# Patient Record
Sex: FEMALE
[De-identification: names, ages, dates, MRNs, and addresses within clinical notes are randomized per-mention and may not be internally consistent; named-entity substitution may affect disease eponyms.]

---

## 2018-09-11 ENCOUNTER — HOSPITAL ENCOUNTER (INPATIENT)
Dept: HOSPITAL 5 - ED | Age: 78
LOS: 1 days | Discharge: LEFT BEFORE DISCHARGE | DRG: 690 | End: 2018-09-12
Attending: INTERNAL MEDICINE | Admitting: INTERNAL MEDICINE
Payer: SELF-PAY

## 2018-09-11 DIAGNOSIS — R91.8: ICD-10-CM

## 2018-09-11 DIAGNOSIS — N30.00: Primary | ICD-10-CM

## 2018-09-11 DIAGNOSIS — R00.1: ICD-10-CM

## 2018-09-11 DIAGNOSIS — F41.9: ICD-10-CM

## 2018-09-11 DIAGNOSIS — F03.90: ICD-10-CM

## 2018-09-11 DIAGNOSIS — Z86.718: ICD-10-CM

## 2018-09-11 LAB
BILIRUB UR QL STRIP: (no result)
BLOOD UR QL VISUAL: (no result)
BUN SERPL-MCNC: 18 MG/DL (ref 7–17)
BUN/CREAT SERPL: 20 %
CALCIUM SERPL-MCNC: 9.4 MG/DL (ref 8.4–10.2)
HCT VFR BLD CALC: 41 % (ref 30.3–42.9)
HEMOLYSIS INDEX: 17
HGB BLD-MCNC: 14.1 GM/DL (ref 10.1–14.3)
MCH RBC QN AUTO: 33 PG (ref 28–32)
MCHC RBC AUTO-ENTMCNC: 34 % (ref 30–34)
MCV RBC AUTO: 96 FL (ref 79–97)
MUCOUS THREADS #/AREA URNS HPF: (no result) /HPF
PH UR STRIP: 7 [PH] (ref 5–7)
PLATELET # BLD: 149 K/MM3 (ref 140–440)
PROT UR STRIP-MCNC: (no result) MG/DL
RBC # BLD AUTO: 4.28 M/MM3 (ref 3.65–5.03)
RBC #/AREA URNS HPF: < 1 /HPF (ref 0–6)
UROBILINOGEN UR-MCNC: 2 MG/DL (ref ?–2)
WBC #/AREA URNS HPF: 19 /HPF (ref 0–6)

## 2018-09-11 PROCEDURE — 93010 ELECTROCARDIOGRAM REPORT: CPT

## 2018-09-11 PROCEDURE — 85027 COMPLETE CBC AUTOMATED: CPT

## 2018-09-11 PROCEDURE — 85379 FIBRIN DEGRADATION QUANT: CPT

## 2018-09-11 PROCEDURE — 93005 ELECTROCARDIOGRAM TRACING: CPT

## 2018-09-11 PROCEDURE — 81001 URINALYSIS AUTO W/SCOPE: CPT

## 2018-09-11 PROCEDURE — 36415 COLL VENOUS BLD VENIPUNCTURE: CPT

## 2018-09-11 PROCEDURE — 80048 BASIC METABOLIC PNL TOTAL CA: CPT

## 2018-09-11 PROCEDURE — 71045 X-RAY EXAM CHEST 1 VIEW: CPT

## 2018-09-11 PROCEDURE — 71260 CT THORAX DX C+: CPT

## 2018-09-11 PROCEDURE — 83036 HEMOGLOBIN GLYCOSYLATED A1C: CPT

## 2018-09-11 PROCEDURE — 85025 COMPLETE CBC W/AUTO DIFF WBC: CPT

## 2018-09-11 PROCEDURE — 84443 ASSAY THYROID STIM HORMONE: CPT

## 2018-09-11 PROCEDURE — 83735 ASSAY OF MAGNESIUM: CPT

## 2018-09-11 PROCEDURE — 70450 CT HEAD/BRAIN W/O DYE: CPT

## 2018-09-11 PROCEDURE — 84484 ASSAY OF TROPONIN QUANT: CPT

## 2018-09-11 NOTE — XRAY REPORT
FINAL REPORT



EXAM:  XR CHEST 1V AP



HISTORY:  hypoxia, dizzyness 



TECHNIQUE:  Frontal chest x-ray performed portably and upright



Comparison: None



FINDINGS:  

Heart size is enlarged with left ventricular configuration

compatible with systemic hypertension.



There is convexity and fullness of the right hilum with a

possible right hilar mass versus prominent pulmonary artery

measuring about 2 centimeters. The overall convexity of the right

hilum is suspicious.



There is mild right perihilar infiltrate.



Left costophrenic angle is blunted.



Aorta is mildly atherosclerotic.



Bones are osteopenic.



IMPRESSION:  

Findings suspicious for right hilar mass.  Right perihilar

infiltrate.



Blunting of left costophrenic sulcus with possible left effusion

versus patchy infiltrate.



Left ventricular configuration to the mildly enlarged heart.



Recommend CT chest with IV contrast to further characterize these

findings.

## 2018-09-11 NOTE — EMERGENCY DEPARTMENT REPORT
ED Syncope HPI





- General


Chief Complaint: Syncope


Stated Complaint: SYNCOPY


Time Seen by Provider: 09/11/18 15:37


Source: patient, family, RN notes reviewed


Exam Limitations: clinical condition, other (patient is demented.  Patient is a 

poor historian)





- History of Present Illness


Initial Comments: 





This is a 78-year-old female who is not known to this provider previously, has 

a past medical history of dementia, possible hypertension.  Patient was 

scheduled for an outpatient ophthalmologic procedure today, was sitting down, 

and reports feeling lightheaded and passing out.  Prior to these symptoms, she 

was not having any complaints.  She currently denies all complaints.  She 

denies severe headache, neck pain, chest pain, abdominal pain, shortness of 

breath.  She denies bright red blood per rectum.  She reports dizziness, but 

cannot further describe it.


Timing/Prior Episodes: single episode today


Precipitating Factors: Positive: none


Context: sitting


Loss of Consciousness: brief (seconds)


Current Symptoms: back to normal





- Related Data


Allergies/Adverse Reactions: 


Allergies





No Known Allergies Allergy (Unverified 09/11/18 13:51)


 








Home Medications: 


Ambulatory Orders





Aricept 10 PO QDAY 09/11/18 











ED Review of Systems


ROS: 


Stated complaint: SYNCOPY


Other details as noted in HPI





Constitutional: denies: diaphoresis, fever


Eyes: denies: eye discharge


ENT: denies: epistaxis


Respiratory: denies: cough


Cardiovascular: syncope.  denies: chest pain


Gastrointestinal: denies: abdominal pain


Genitourinary: denies: dysuria


Musculoskeletal: denies: back pain


Neurological: headache


Psychiatric: anxiety





ED Past Medical Hx





- Past Medical History


Additional medical history: New onset of Dementia





- Social History


Smoking Status: Never Smoker


Substance Use Type: None





- Medications


Home Medications: 


 Home Medications











 Medication  Instructions  Recorded  Confirmed  Last Taken  Type


 


Aricept 10 PO QDAY 09/11/18  09/10/18 20:00 History














ED Physical Exam





- General


Limitations: Other (patient is demented.  Patient is a poor historian)


General appearance: alert, in no apparent distress





- Head


Head exam: Present: atraumatic, normocephalic





- Eye


Eye exam: Present: normal appearance, EOMI, other (visual acuity intact to 

finger counting, color perception, reading at a close distance).  Absent: 

nystagmus





- ENT


ENT exam: Present: normal exam, normal orophraynx, mucous membranes moist, 

normal external ear exam





- Neck


Neck exam: Present: normal inspection, full ROM.  Absent: tenderness, 

meningismus





- Respiratory


Respiratory exam: Present: decreased breath sounds.  Absent: respiratory 

distress





- Cardiovascular


Cardiovascular Exam: Present: normal rhythm, bradycardia, normal heart sounds.  

Absent: systolic murmur, diastolic murmur, rubs, gallop





- GI/Abdominal


GI/Abdominal exam: Present: soft.  Absent: distended, tenderness, guarding, 

rebound, rigid, pulsatile mass





- Extremities Exam


Extremities exam: Present: normal inspection, full ROM, normal capillary refill

, other (2+ pulses noted in the bilateral upper, lower extremities.  

Compartments soft.  No long bony tenderness.  The pelvis is stable.).  Absent: 

pedal edema, joint swelling, calf tenderness (there is no palpable cord.  There 

is a negative Homans sign)





- Back Exam


Back exam: Present: normal inspection, full ROM.  Absent: tenderness, CVA 

tenderness (R), paraspinal tenderness, vertebral tenderness





- Neurological Exam


Neurological exam: Present: alert, oriented X3, CN II-XII intact, other (

Extraocular movements intact.  Tongue midline.  No facial droop.  Facial 

sensation intact to light touch in the V1, V2, V3 distribution bilaterally.  5 

and 5 strength in 4 extremities..  Sensation is intact to light touch in 4 

extremities.).  Absent: motor sensory deficit





- Psychiatric


Psychiatric exam: Present: normal affect, normal mood





- Skin


Skin exam: Present: warm, dry, intact, normal color.  Absent: rash





ED Course


 Vital Signs











  09/11/18 09/11/18 09/11/18





  13:28 13:30 13:39


 


Temperature   97.4 F L


 


Pulse Rate 51 L 61 58 L


 


Respiratory 11 L 12 19





Rate   


 


Blood Pressure  188/90 155/78


 


Blood Pressure   155/78





[Left]   


 


O2 Sat by Pulse   95





Oximetry   














  09/11/18 09/11/18 09/11/18





  13:46 14:00 14:16


 


Temperature   


 


Pulse Rate 53 L 66 58 L


 


Respiratory 14 14 17





Rate   


 


Blood Pressure 187/81 186/85 180/78


 


Blood Pressure   





[Left]   


 


O2 Sat by Pulse   





Oximetry   














  09/11/18 09/11/18 09/11/18





  14:30 14:45 15:00


 


Temperature   


 


Pulse Rate 43 L 65 60


 


Respiratory 14 15 16





Rate   


 


Blood Pressure 170/72 181/76 181/76


 


Blood Pressure   





[Left]   


 


O2 Sat by Pulse  94 





Oximetry   














  09/11/18 09/11/18 09/11/18





  15:16 15:30 15:46


 


Temperature   


 


Pulse Rate 64 62 50 L


 


Respiratory 12 16 16





Rate   


 


Blood Pressure 186/92 186/92 186/92


 


Blood Pressure   





[Left]   


 


O2 Sat by Pulse  96 96





Oximetry   














  09/11/18 09/11/18 09/11/18





  16:00 16:16 16:30


 


Temperature   


 


Pulse Rate 82 71 70


 


Respiratory 15 14 15





Rate   


 


Blood Pressure 186/92 186/92 186/92


 


Blood Pressure   





[Left]   


 


O2 Sat by Pulse 92 92 88





Oximetry   














  09/11/18 09/11/18





  17:10 17:16


 


Temperature  


 


Pulse Rate  70


 


Respiratory  16





Rate  


 


Blood Pressure 187/84 179/71


 


Blood Pressure  





[Left]  


 


O2 Sat by Pulse 91 93





Oximetry  














ED Medical Decision Making





- Lab Data


Result diagrams: 


 09/11/18 13:58





 09/11/18 15:12








 Vital Signs











  09/11/18 09/11/18 09/11/18





  13:28 13:30 13:39


 


Temperature   97.4 F L


 


Pulse Rate 51 L 61 58 L


 


Respiratory 11 L 12 19





Rate   


 


Blood Pressure  188/90 155/78


 


Blood Pressure   155/78





[Left]   


 


O2 Sat by Pulse   95





Oximetry   














  09/11/18 09/11/18 09/11/18





  13:46 14:00 14:16


 


Temperature   


 


Pulse Rate 53 L 66 58 L


 


Respiratory 14 14 17





Rate   


 


Blood Pressure 187/81 186/85 180/78


 


Blood Pressure   





[Left]   


 


O2 Sat by Pulse   





Oximetry   














  09/11/18 09/11/18 09/11/18





  14:30 14:45 15:00


 


Temperature   


 


Pulse Rate 43 L 65 60


 


Respiratory 14 15 16





Rate   


 


Blood Pressure 170/72 181/76 181/76


 


Blood Pressure   





[Left]   


 


O2 Sat by Pulse  94 





Oximetry   














  09/11/18 09/11/18 09/11/18





  15:16 15:30 15:46


 


Temperature   


 


Pulse Rate 64 62 50 L


 


Respiratory 12 16 16





Rate   


 


Blood Pressure 186/92 186/92 186/92


 


Blood Pressure   





[Left]   


 


O2 Sat by Pulse  96 96





Oximetry   














  09/11/18 09/11/18 09/11/18





  16:00 16:16 16:30


 


Temperature   


 


Pulse Rate 82 71 70


 


Respiratory 15 14 15





Rate   


 


Blood Pressure 186/92 186/92 186/92


 


Blood Pressure   





[Left]   


 


O2 Sat by Pulse 92 92 88





Oximetry   














  09/11/18 09/11/18





  17:10 17:16


 


Temperature  


 


Pulse Rate  70


 


Respiratory  16





Rate  


 


Blood Pressure 187/84 179/71


 


Blood Pressure  





[Left]  


 


O2 Sat by Pulse 91 93





Oximetry  











 Lab Results











  09/11/18 09/11/18 09/11/18 Range/Units





  13:58 15:12 15:40 


 


WBC  7.1    (4.5-11.0)  K/mm3


 


RBC  4.28    (3.65-5.03)  M/mm3


 


Hgb  14.1    (10.1-14.3)  gm/dl


 


Hct  41.0    (30.3-42.9)  %


 


MCV  96    (79-97)  fl


 


MCH  33 H    (28-32)  pg


 


MCHC  34    (30-34)  %


 


RDW  13.3    (13.2-15.2)  %


 


Plt Count  149    (140-440)  K/mm3


 


D-Dimer     (0-234)  ng/mlDDU


 


Sodium   140   (137-145)  mmol/L


 


Potassium   5.0   (3.6-5.0)  mmol/L


 


Chloride   103.0   ()  mmol/L


 


Carbon Dioxide   26   (22-30)  mmol/L


 


Anion Gap   16   mmol/L


 


BUN   18 H   (7-17)  mg/dL


 


Creatinine   0.9   (0.7-1.2)  mg/dL


 


Estimated GFR   > 60   ml/min


 


BUN/Creatinine Ratio   20   %


 


Glucose   107 H   ()  mg/dL


 


Calcium   9.4   (8.4-10.2)  mg/dL


 


Magnesium     (1.7-2.3)  mg/dL


 


Troponin T     (0.00-0.029)  ng/mL


 


TSH     (0.270-4.200)  mlU/mL


 


Urine Color    Yellow  (Yellow)  


 


Urine Turbidity    Slightly-cloudy  (Clear)  


 


Urine pH    7.0  (5.0-7.0)  


 


Ur Specific Gravity    1.013  (1.003-1.030)  


 


Urine Protein    <15 mg/dl  (Negative)  mg/dL


 


Urine Glucose (UA)    Neg  (Negative)  mg/dL


 


Urine Ketones    Tr  (Negative)  mg/dL


 


Urine Blood    Neg  (Negative)  


 


Urine Nitrite    Neg  (Negative)  


 


Urine Bilirubin    Neg  (Negative)  


 


Urine Urobilinogen    2.0  (<2.0)  mg/dL


 


Ur Leukocyte Esterase    Lg  (Negative)  


 


Urine WBC (Auto)    19.0 H  (0.0-6.0)  /HPF


 


Urine RBC (Auto)    < 1.0  (0.0-6.0)  /HPF


 


U Epithel Cells (Auto)    1.0  (0-13.0)  /HPF


 


Urine Mucus    Few  /HPF














  09/11/18 09/11/18 09/11/18 Range/Units





  15:51 15:51 15:51 


 


WBC     (4.5-11.0)  K/mm3


 


RBC     (3.65-5.03)  M/mm3


 


Hgb     (10.1-14.3)  gm/dl


 


Hct     (30.3-42.9)  %


 


MCV     (79-97)  fl


 


MCH     (28-32)  pg


 


MCHC     (30-34)  %


 


RDW     (13.2-15.2)  %


 


Plt Count     (140-440)  K/mm3


 


D-Dimer  186.24    (0-234)  ng/mlDDU


 


Sodium     (137-145)  mmol/L


 


Potassium     (3.6-5.0)  mmol/L


 


Chloride     ()  mmol/L


 


Carbon Dioxide     (22-30)  mmol/L


 


Anion Gap     mmol/L


 


BUN     (7-17)  mg/dL


 


Creatinine     (0.7-1.2)  mg/dL


 


Estimated GFR     ml/min


 


BUN/Creatinine Ratio     %


 


Glucose     ()  mg/dL


 


Calcium     (8.4-10.2)  mg/dL


 


Magnesium   2.00   (1.7-2.3)  mg/dL


 


Troponin T   < 0.010   (0.00-0.029)  ng/mL


 


TSH    1.440  (0.270-4.200)  mlU/mL


 


Urine Color     (Yellow)  


 


Urine Turbidity     (Clear)  


 


Urine pH     (5.0-7.0)  


 


Ur Specific Gravity     (1.003-1.030)  


 


Urine Protein     (Negative)  mg/dL


 


Urine Glucose (UA)     (Negative)  mg/dL


 


Urine Ketones     (Negative)  mg/dL


 


Urine Blood     (Negative)  


 


Urine Nitrite     (Negative)  


 


Urine Bilirubin     (Negative)  


 


Urine Urobilinogen     (<2.0)  mg/dL


 


Ur Leukocyte Esterase     (Negative)  


 


Urine WBC (Auto)     (0.0-6.0)  /HPF


 


Urine RBC (Auto)     (0.0-6.0)  /HPF


 


U Epithel Cells (Auto)     (0-13.0)  /HPF


 


Urine Mucus     /HPF














- EKG Data


-: EKG Interpreted by Me





- EKG Data


When compared to previous EKG there are: previous EKG unavailable





09/11/18 17:36


Sinus bradycardia, 49 bpm, normal axis, a long AZ interval.  Low voltage.  Poor 

R-wave progression.  Abnormal EKG.  Not having chest pain.  Not a STEMI.





- Radiology Data


Radiology results: report reviewed, image reviewed


interpreted by me: 





Single portable view of the chest demonstrates poor inspiratory effort, 

elevated left hemidiaphragm, no acute disease





Noncontrast CT scan of the brain is negative for acute disease





- Medical Decision Making





Differential diagnosis, including but not limited to: Orthostasis, structural 

cardiac disease, arrhythmia, pulmonary embolus, intracranial injury, stroke, TIA

,, pneumonia, urinary tract infection











Assessment and plan: 78-year-old female with nonspecific resolved syncope and 

dizziness.  GCS of 15, and that score of 0.  D-dimer negative, initially 

saturating at 93, 94%.  Denies chest pain and shortness of breath.  sHe has no 

temporal tenderness, and has no jaw claudication.  Urinalysis is nonspecific 

with 19 white blood cells and leukocyte esterase.  However does not endorse 

urinary symptoms





Patient has been observed in the ER for hours without recurrent event.  However

, she is impressively bradycardic with a prolonged AZ interval.  I suspect 

primary cardiac issue as the underlying etiology of her syncopal event.  We 

will withhold ashwini blocking agents.  She'll be admitted to the medical service 

for further evaluation of her syncope.  She is normotensive at this time with 

an appropriate mental status, she does not require emergency cardiology consult 

or emergency pacing at this time.  I will defer to inpatient cardiology team if 

they feel like an inpatient cardiology consultation is appropriate, in the 

Hospital physician, Dr. Landaverde has accepted the patient to his service.








Critical care attestation.: 


If time is entered above; I have spent that time in minutes in the direct care 

of this critically ill patient, excluding procedure time.








ED Disposition


Clinical Impression: 


 Syncope, Bradycardia





Disposition: DC-[id number] OP ADMIT IP TO THIS HOSP


Is pt being admited?: Yes


Condition: Good


Instructions:  Syncope (ED)


Referrals: 


PRIMARY CARE,MD [Primary Care Provider] - 3-5 Days

## 2018-09-11 NOTE — CAT SCAN REPORT
FINAL REPORT



PROCEDURE:  CT CHEST W CON



TECHNIQUE:  Computerized axial tomography of the chest was

performed during the IV injection of iodinated nonionic contrast.





HISTORY:  right hilar mass 



COMPARISON:  No prior studies are available for comparison.



TECHNICAL QUALITY:  Satisfactory.



FINDINGS:  

Diffuse thickening of interstitial septa is noted there are no

confluent infiltrates or mass lesions. Pleural spaces are clear.

Bilateral hilar structures are within normal limits. Aorta is of

normal caliber. Coronary arterial calcification is noted. There

is mild cardiomegaly. Small hiatal hernia is noted. Visualized

thyroid demonstrates normal size and density. There is no

lymphadenopathy. Moderate degree wedge compression deformity is

noted involving T9 vertebral body without obvious spinal canal

compromise. Multiple cystic lesions are identified in bilateral

lobes of liver largest measuring 1.8 x 1.3 centimeters located in

the left lobe. 



IMPRESSION:  

Bilateral lungs demonstrate interstitial septal thickening most

likely representing interstitial fibrosis



No acute pulmonary infiltrates



Cardiomegaly with coronary arterial calcification



Small hiatal hernia



Multiple cystic lesions of liver most likely represent simple

cysts. Ultrasound evaluation may be recommended.

## 2018-09-11 NOTE — CAT SCAN REPORT
FINAL REPORT



EXAM:  CT HEAD/BRAIN WO CON



HISTORY:  syncope dizzyness 



TECHNIQUE:  CT examination of the head without IV contrast



PRIORS:  None.



FINDINGS:  

Slight mucosal thickening left sphenoid sinus. Slight mucosal

thickening mid right ethmoid sinus with small fluid level. Clear

mastoid air cells and middle ear cavities. No acute air-fluid

level visualized in the included air-filled sinuses. 



Bone windows demonstrate no acute fracture. 



There is ventricular and sulcal prominence compatible with global

cerebrocortical atrophy. 



Low attenuation regions in the cerebral white matter, while

nonspecific, are present and usually attributed to chronic

ischemic gliosis. It can occur secondary to the normal aging

process, hypertension, or arterial sclerotic vascular disease.

The differential includes demyelination in the appropriate

clinical setting.



The brain contains no mass, mass effect, hemorrhage, or acute

infarct. There is no extra-axial intracranial bleed or brain

bleed. There is no midline shift. 







IMPRESSION:  

No acute CVA, intracranial bleed, or brain mass



Nonspecific small fluid level in right ethmoid sinus may reflect

acute sinusitis

## 2018-09-12 VITALS — SYSTOLIC BLOOD PRESSURE: 165 MMHG | DIASTOLIC BLOOD PRESSURE: 86 MMHG

## 2018-09-12 LAB
BASOPHILS # (AUTO): 0 K/MM3 (ref 0–0.1)
BASOPHILS NFR BLD AUTO: 0.2 % (ref 0–1.8)
BUN SERPL-MCNC: 17 MG/DL (ref 7–17)
BUN/CREAT SERPL: 17 %
CALCIUM SERPL-MCNC: 8.9 MG/DL (ref 8.4–10.2)
EOSINOPHIL # BLD AUTO: 0.1 K/MM3 (ref 0–0.4)
EOSINOPHIL NFR BLD AUTO: 1.7 % (ref 0–4.3)
HCT VFR BLD CALC: 40.7 % (ref 30.3–42.9)
HEMOLYSIS INDEX: 22
HGB BLD-MCNC: 13.7 GM/DL (ref 10.1–14.3)
LYMPHOCYTES # BLD AUTO: 1.6 K/MM3 (ref 1.2–5.4)
LYMPHOCYTES NFR BLD AUTO: 24.4 % (ref 13.4–35)
MCH RBC QN AUTO: 33 PG (ref 28–32)
MCHC RBC AUTO-ENTMCNC: 34 % (ref 30–34)
MCV RBC AUTO: 97 FL (ref 79–97)
MONOCYTES # (AUTO): 0.4 K/MM3 (ref 0–0.8)
MONOCYTES % (AUTO): 5.6 % (ref 0–7.3)
PLATELET # BLD: 177 K/MM3 (ref 140–440)
RBC # BLD AUTO: 4.2 M/MM3 (ref 3.65–5.03)

## 2018-09-12 NOTE — EVENT NOTE
Date: 09/12/18


Patient left AMA prior to be seen. I never saw this patient or knew of this 

patient being here prior to her name popping up on my list.

## 2019-09-20 NOTE — HISTORY AND PHYSICAL REPORT
History of Present Illness


Date of examination: 09/11/18


Date of admission: 


09/11/18 17:41





Chief complaint: 


Chief complaint:


Passed  out at the ophthalmologist office around 12:00 noon





History of present illness: 





 History of Present Illness


Initial Comments: 





This is a 78-year-old female who is not known to this provider previously, has 

a past medical history of dementia, possible hypertension.  Patient was 

scheduled for an outpatient ophthalmologic procedure today, was sitting down, 

and reports feeling lightheaded and passing out.  Prior to these symptoms, she 

was not having any complaints.  She currently denies all complaints.  She 

denies severe headache, neck pain, chest pain, abdominal pain, shortness of 

breath.  She denies bright red blood per rectum.  She reports dizziness, but 

cannot further describe it.


Timing/Prior Episodes: single episode today


Precipitating Factors: Positive: none


Context: sitting


Loss of Consciousness: brief (seconds)


Current Symptoms: back to normal





- Related Data


Allergies/Adverse Reactions: 


Allergies





No Known Allergies Allergy (Unverified 09/11/18 13:51)


 








Home Medications: 


Ambulatory Orders





Aricept 10 PO QDAY 09/11/18 











ED Review of Systems


ROS: 


Stated complaint: SYNCOPY


Other details as noted in HPI





Constitutional: denies: diaphoresis, fever


Eyes: denies: eye discharge


ENT: denies: epistaxis


Respiratory: denies: cough


Cardiovascular: syncope.  denies: chest pain


Gastrointestinal: denies: abdominal pain


Genitourinary: denies: dysuria


Musculoskeletal: denies: back pain


Neurological: headache


Psychiatric: anxiety





ED Past Medical Hx





- Past Medical History


Additional medical history: New onset of Dementia





- Social History


Smoking Status: Never Smoker


Substance Use Type: None





- Medications


Home Medications: 


 Home Medications











 Medication  Instructions  Recorded  Confirmed  Last Taken  Type


 


Aricept 10 PO QDAY 09/11/18  09/10/18 20:00 History

















Medications and Allergies


 Allergies











Allergy/AdvReac Type Severity Reaction Status Date / Time


 


No Known Allergies Allergy   Unverified 09/11/18 13:51











 Home Medications











 Medication  Instructions  Recorded  Confirmed  Last Taken  Type


 


Aricept 10 PO QDAY 09/11/18  09/10/18 20:00 History














Review of Systems


All systems: negative





Exam





- Constitutional


Vitals: 


 











Temp Pulse Resp BP Pulse Ox


 


 97.4 F L  75   17   160/75   93 


 


 09/11/18 13:39  09/11/18 19:46  09/11/18 19:46  09/11/18 19:46  09/11/18 19:46











General appearance: Present: no acute distress, well-nourished





- EENT


Eyes: Present: PERRL


ENT: hearing intact, clear oral mucosa





- Neck


Neck: Present: supple, normal ROM





- Respiratory


Respiratory effort: normal


Respiratory: bilateral: CTA





- Cardiovascular


Heart rate: 49


Rhythm: regular


Heart Sounds: Present: S1 & S2.  Absent: rub, click





- Extremities


Extremities: no ischemia, pulses intact, pulses symmetrical, No edema


Peripheral Pulses: within normal limits





- Abdominal


General gastrointestinal: Present: soft, non-tender, non-distended, normal 

bowel sounds


Female genitourinary: Present: normal





- Integumentary


Integumentary: Present: clear, warm, dry





- Musculoskeletal


Musculoskeletal: gait normal, strength equal bilaterally





- Psychiatric


Psychiatric: appropriate mood/affect, intact judgment & insight





- Neurologic


Neurologic: CNII-XII intact, moves all extremities





- Allied Health


Allied health notes reviewed: nursing, case management





Results





- Labs


CBC & Chem 7: 


 09/11/18 13:58





 09/11/18 15:12


Labs: 


 Laboratory Last Values











WBC  7.1 K/mm3 (4.5-11.0)   09/11/18  13:58    


 


RBC  4.28 M/mm3 (3.65-5.03)   09/11/18  13:58    


 


Hgb  14.1 gm/dl (10.1-14.3)   09/11/18  13:58    


 


Hct  41.0 % (30.3-42.9)   09/11/18  13:58    


 


MCV  96 fl (79-97)   09/11/18  13:58    


 


MCH  33 pg (28-32)  H  09/11/18  13:58    


 


MCHC  34 % (30-34)   09/11/18  13:58    


 


RDW  13.3 % (13.2-15.2)   09/11/18  13:58    


 


Plt Count  149 K/mm3 (140-440)   09/11/18  13:58    


 


D-Dimer  186.24 ng/mlDDU (0-234)   09/11/18  15:51    


 


Sodium  140 mmol/L (137-145)   09/11/18  15:12    


 


Potassium  5.0 mmol/L (3.6-5.0)   09/11/18  15:12    


 


Chloride  103.0 mmol/L ()   09/11/18  15:12    


 


Carbon Dioxide  26 mmol/L (22-30)   09/11/18  15:12    


 


Anion Gap  16 mmol/L  09/11/18  15:12    


 


BUN  18 mg/dL (7-17)  H  09/11/18  15:12    


 


Creatinine  0.9 mg/dL (0.7-1.2)   09/11/18  15:12    


 


Estimated GFR  > 60 ml/min  09/11/18  15:12    


 


BUN/Creatinine Ratio  20 %  09/11/18  15:12    


 


Glucose  107 mg/dL ()  H  09/11/18  15:12    


 


Calcium  9.4 mg/dL (8.4-10.2)   09/11/18  15:12    


 


Magnesium  2.00 mg/dL (1.7-2.3)   09/11/18  15:51    


 


Troponin T  < 0.010 ng/mL (0.00-0.029)   09/11/18  15:51    


 


TSH  1.440 mlU/mL (0.270-4.200)   09/11/18  15:51    


 


Urine Color  Yellow  (Yellow)   09/11/18  15:40    


 


Urine Turbidity  Slightly-cloudy  (Clear)   09/11/18  15:40    


 


Urine pH  7.0  (5.0-7.0)   09/11/18  15:40    


 


Ur Specific Gravity  1.013  (1.003-1.030)   09/11/18  15:40    


 


Urine Protein  <15 mg/dl mg/dL (Negative)   09/11/18  15:40    


 


Urine Glucose (UA)  Neg mg/dL (Negative)   09/11/18  15:40    


 


Urine Ketones  Tr mg/dL (Negative)   09/11/18  15:40    


 


Urine Blood  Neg  (Negative)   09/11/18  15:40    


 


Urine Nitrite  Neg  (Negative)   09/11/18  15:40    


 


Urine Bilirubin  Neg  (Negative)   09/11/18  15:40    


 


Urine Urobilinogen  2.0 mg/dL (<2.0)   09/11/18  15:40    


 


Ur Leukocyte Esterase  Lg  (Negative)   09/11/18  15:40    


 


Urine WBC (Auto)  19.0 /HPF (0.0-6.0)  H  09/11/18  15:40    


 


Urine RBC (Auto)  < 1.0 /HPF (0.0-6.0)   09/11/18  15:40    


 


U Epithel Cells (Auto)  1.0 /HPF (0-13.0)   09/11/18  15:40    


 


Urine Mucus  Few /HPF  09/11/18  15:40    














- Imaging and Cardiology


EKG: report reviewed (49/m no ST-T wave changes prolonged MA interval EKG 

interpreted by me)


Imaging and Cardiology: 


Chest x-ray:


IMPRESSION: Findings suspicious for right hilar mass. Right perihilar 

infiltrate. Blunting of left costophrenic sulcus with possible left effusion 

versus patchy infiltrate. Left ventricular configuration to the mildly enlarged 

heart. Recommend CT chest with IV contrast to further characterize these 

findings. 








Head CT


IMPRESSION: No acute CVA, intracranial bleed, or brain mass Nonspecific small 

fluid level in right ethmoid sinus may reflect acute sinusitis 








Assessment and Plan


Advance Directives: Yes (full code full code)


VTE prophylaxis?: Chemical


Plan of care discussed with patient/family: Yes





- Patient Problems


(1) Syncope


Current Visit: Yes   Status: Acute   


Qualifiers: 


   Syncope type: unspecified   Qualified Code(s): R55 - Syncope and collapse   


Plan to address problem: 


Syncope workup


Carotid duplex scan


Echocardiogram


Amy scan ordered








(2) Bradycardia


Current Visit: Yes   Status: Acute   


Plan to address problem: 


Syncope probably secondary to bradycardia


Cardiology consult requested








(3) Dementia


Current Visit: Yes   Status: Chronic   


Qualifiers: 


   Dementia type: vascular dementia 


Plan to address problem: 


Patient diagnosed with dementia


On Aricept by Dr. HOWE


Dementia mild








(4) Urinary tract infection


Current Visit: Yes   Status: Acute   


Qualifiers: 


   Urinary tract infection type: acute cystitis 


Plan to address problem: 


IV Rocephin started








(5) DVT prophylaxis


Current Visit: Yes   Status: Acute   


Plan to address problem: 


On Lovenox


GI prophylaxis initiated Retinal tear and detachment warning symptoms reviewed and patient instructed to call immediately if increasing floaters, flashes, or decreasing peripheral vision.